# Patient Record
Sex: MALE | Race: ASIAN | NOT HISPANIC OR LATINO | Employment: OTHER | URBAN - METROPOLITAN AREA
[De-identification: names, ages, dates, MRNs, and addresses within clinical notes are randomized per-mention and may not be internally consistent; named-entity substitution may affect disease eponyms.]

---

## 2017-10-05 ENCOUNTER — OFFICE VISIT - HEALTHEAST (OUTPATIENT)
Dept: FAMILY MEDICINE | Facility: CLINIC | Age: 67
End: 2017-10-05

## 2017-10-05 DIAGNOSIS — Z71.84 TRAVEL ADVICE ENCOUNTER: ICD-10-CM

## 2017-10-05 RX ORDER — DOXYCYCLINE 100 MG/1
100 CAPSULE ORAL DAILY
Qty: 60 CAPSULE | Refills: 0 | Status: SHIPPED | OUTPATIENT
Start: 2017-10-05

## 2017-10-05 ASSESSMENT — MIFFLIN-ST. JEOR: SCORE: 1210.99

## 2021-05-31 VITALS — HEIGHT: 63 IN | WEIGHT: 124.75 LBS | BODY MASS INDEX: 22.11 KG/M2

## 2021-06-13 NOTE — PROGRESS NOTES
Travel advice  No issues  ROS: as noted above    OBJECTIVE:   Vitals:    10/05/17 1535   BP: 120/74   Pulse: (!) 48   Resp: 20      Eyes: non icteric, noninflamed  Lungs: no resp distress  Heart: regular  Ankles: no edema  Muscles: nontender  Mental status: euthymic  Neuro: nonfocal  ASSESSMENT/PLAN:    1. Travel advice encounter  doxycycline (VIBRAMYCIN) 100 MG capsule    typhoid (VIVOTIF) SR capsule    Pneumococcal conjugate vaccine 13-valent 6wks-17yrs; >50yrs     More than 10 of fifteen total minutes time spent education counseling regarding the issues and care of same as listed in the assessment and plan of this note

## 2021-09-24 ENCOUNTER — ANCILLARY PROCEDURE (OUTPATIENT)
Dept: ULTRASOUND IMAGING | Facility: CLINIC | Age: 71
End: 2021-09-24
Attending: EMERGENCY MEDICINE
Payer: MEDICARE

## 2021-09-24 ENCOUNTER — HOSPITAL ENCOUNTER (EMERGENCY)
Facility: CLINIC | Age: 71
Discharge: HOME | End: 2021-09-25
Attending: EMERGENCY MEDICINE | Admitting: EMERGENCY MEDICINE
Payer: MEDICARE

## 2021-09-24 ENCOUNTER — APPOINTMENT (OUTPATIENT)
Dept: RADIOLOGY | Facility: CLINIC | Age: 71
End: 2021-09-24
Attending: EMERGENCY MEDICINE
Payer: MEDICARE

## 2021-09-24 DIAGNOSIS — J90 PLEURAL EFFUSION: ICD-10-CM

## 2021-09-24 LAB
ANION GAP SERPL CALCULATED.3IONS-SCNC: 9 MMOL/L (ref 5–18)
BNP SERPL-MCNC: 48 PG/ML (ref 0–70)
BUN SERPL-MCNC: 12 MG/DL (ref 8–28)
CALCIUM SERPL-MCNC: 8.9 MG/DL (ref 8.5–10.5)
CHLORIDE BLD-SCNC: 103 MMOL/L (ref 98–107)
CO2 SERPL-SCNC: 25 MMOL/L (ref 22–31)
CREAT SERPL-MCNC: 0.77 MG/DL (ref 0.7–1.3)
ERYTHROCYTE [DISTWIDTH] IN BLOOD BY AUTOMATED COUNT: 12 % (ref 10–15)
GFR SERPL CREATININE-BSD FRML MDRD: >90 ML/MIN/1.73M2
GLUCOSE BLD-MCNC: 157 MG/DL (ref 70–125)
HCT VFR BLD AUTO: 40.8 % (ref 40–53)
HGB BLD-MCNC: 13.8 G/DL (ref 13.3–17.7)
MCH RBC QN AUTO: 32.1 PG (ref 26.5–33)
MCHC RBC AUTO-ENTMCNC: 33.8 G/DL (ref 31.5–36.5)
MCV RBC AUTO: 95 FL (ref 78–100)
PLATELET # BLD AUTO: 239 10E3/UL (ref 150–450)
POTASSIUM BLD-SCNC: 4 MMOL/L (ref 3.5–5)
RBC # BLD AUTO: 4.3 10E6/UL (ref 4.4–5.9)
SODIUM SERPL-SCNC: 137 MMOL/L (ref 136–145)
TROPONIN I SERPL-MCNC: <0.01 NG/ML (ref 0–0.29)
WBC # BLD AUTO: 8.7 10E3/UL (ref 4–11)

## 2021-09-24 PROCEDURE — 272N000706 US THORACENTESIS

## 2021-09-24 PROCEDURE — C9803 HOPD COVID-19 SPEC COLLECT: HCPCS

## 2021-09-24 PROCEDURE — 84484 ASSAY OF TROPONIN QUANT: CPT | Performed by: EMERGENCY MEDICINE

## 2021-09-24 PROCEDURE — 89051 BODY FLUID CELL COUNT: CPT | Performed by: EMERGENCY MEDICINE

## 2021-09-24 PROCEDURE — 87205 SMEAR GRAM STAIN: CPT | Performed by: EMERGENCY MEDICINE

## 2021-09-24 PROCEDURE — 83880 ASSAY OF NATRIURETIC PEPTIDE: CPT | Performed by: EMERGENCY MEDICINE

## 2021-09-24 PROCEDURE — 99285 EMERGENCY DEPT VISIT HI MDM: CPT | Mod: 25

## 2021-09-24 PROCEDURE — 32555 ASPIRATE PLEURA W/ IMAGING: CPT

## 2021-09-24 PROCEDURE — 85027 COMPLETE CBC AUTOMATED: CPT | Performed by: EMERGENCY MEDICINE

## 2021-09-24 PROCEDURE — 71045 X-RAY EXAM CHEST 1 VIEW: CPT

## 2021-09-24 PROCEDURE — 87070 CULTURE OTHR SPECIMN AEROBIC: CPT | Performed by: EMERGENCY MEDICINE

## 2021-09-24 PROCEDURE — 93005 ELECTROCARDIOGRAM TRACING: CPT | Performed by: EMERGENCY MEDICINE

## 2021-09-24 PROCEDURE — 89050 BODY FLUID CELL COUNT: CPT | Performed by: EMERGENCY MEDICINE

## 2021-09-24 PROCEDURE — 83615 LACTATE (LD) (LDH) ENZYME: CPT | Performed by: EMERGENCY MEDICINE

## 2021-09-24 PROCEDURE — 36415 COLL VENOUS BLD VENIPUNCTURE: CPT | Performed by: EMERGENCY MEDICINE

## 2021-09-24 PROCEDURE — 80048 BASIC METABOLIC PNL TOTAL CA: CPT | Performed by: EMERGENCY MEDICINE

## 2021-09-24 PROCEDURE — 84157 ASSAY OF PROTEIN OTHER: CPT | Performed by: EMERGENCY MEDICINE

## 2021-09-24 PROCEDURE — U0005 INFEC AGEN DETEC AMPLI PROBE: HCPCS | Performed by: EMERGENCY MEDICINE

## 2021-09-24 NOTE — ED PROVIDER NOTES
EMERGENCY DEPARTMENT ENCOUnter      NAME: Karri Scott  AGE: 71 year old male  YOB: 1950  MRN: [id number]  EVALUATION DATE & TIME: 2021  6:37 PM    PCP: No primary care provider on file.    ED PROVIDER: Timothy Hutton DO      Chief Complaint   Patient presents with     Shortness of Breath       FINAL IMPRESSION:  1. Pleural effusion        ED COURSE & MEDICAL DECISION MAKIN:01 PM I met with the patient, obtained history, performed an initial exam, and discussed options and plan for diagnostics and treatment here in the ED. Room 12. PPE worn: N95, goggles, gloves.  8:46 PM Rechecked patient and updated on results.  1:24 AM Rechecked patient and updated on results. Discussed plan for discharge - patient agreeable.    The patient presented to the emergency department today with worsening shortness of breath with exertion.  His x-ray today reveals evidence of a large right pleural effusion.  Laboratory testing has been unremarkable.  His case was discussed with interventional radiology and they felt that he would be amenable to an ultrasound-guided thoracentesis.  This was performed in approximately 1.4 L of fluid was removed.  The patient was feeling much better after this.  Fluid will be sent for testing.  Given the patient's overall well appearance and otherwise normal work-up I feel that he can be safely discharged home with instructions for close outpatient follow-up.  He has been instructed to return right away if there are any worsening symptoms or other concerns and he is comfortable with this plan.      At the conclusion of the encounter I discussed the results of all of the tests and the disposition. The questions were answered. The patient or family acknowledged understanding and was agreeable with the care plan.       =================================================================    HPI    Karri Scott is a 71 year old male who presents to the emergency department today with  complaints of shortness of breath.  He states that his symptoms have been progressive over the last few months but now he gets very winded with even minimal activity.  He does have a cough with mild production and notes that it is difficult to sleep at night unless he is sitting up in bed.  He denies any pain at rest.  No fevers.  He was seen earlier today at the clinic and told to come to the emergency department for further evaluation given concerns for possible congestive heart failure.  He denies any leg pain or swelling.  No other current complaints.      REVIEW OF SYSTEMS   Constitutional:  Denies fever or chills  HENT:  Denies sore throat   Respiratory: Positive for cough and shortness of breath  Cardiovascular:  Denies chest pain or palpitations  GI:  Denies abdominal pain, nausea, or vomiting  Musculoskeletal:  Denies any new extremity pain   Skin:  Denies rash   Neurologic:  Denies headache, focal weakness or sensory changes    All other systems reviewed and are negative      PAST MEDICAL HISTORY:  History reviewed. No pertinent past medical history.    PAST SURGICAL HISTORY:  History reviewed. No pertinent surgical history.    CURRENT MEDICATIONS:    ciprofloxacin (CIPRO) 500 MG tablet  doxycycline (VIBRAMYCIN) 100 MG capsule  typhoid (VIVOTIF) SR capsule  typhoid VI polysacch vaccine (TYPHIM VI) 25 mcg/0.5 mL Soln      ALLERGIES:  No Known Allergies    FAMILY HISTORY:  No family history on file.    SOCIAL HISTORY:   Social History     Socioeconomic History     Marital status:      Spouse name: None     Number of children: None     Years of education: None     Highest education level: None   Occupational History     None   Tobacco Use     Smoking status: Never Smoker     Smokeless tobacco: Never Used   Substance and Sexual Activity     Alcohol use: None     Drug use: None     Sexual activity: None   Other Topics Concern     None   Social History Narrative     None     Social Determinants of Health      Financial Resource Strain:      Difficulty of Paying Living Expenses:    Food Insecurity:      Worried About Running Out of Food in the Last Year:      Ran Out of Food in the Last Year:    Transportation Needs:      Lack of Transportation (Medical):      Lack of Transportation (Non-Medical):    Physical Activity:      Days of Exercise per Week:      Minutes of Exercise per Session:    Stress:      Feeling of Stress :    Social Connections:      Frequency of Communication with Friends and Family:      Frequency of Social Gatherings with Friends and Family:      Attends Sikhism Services:      Active Member of Clubs or Organizations:      Attends Club or Organization Meetings:      Marital Status:    Intimate Partner Violence:      Fear of Current or Ex-Partner:      Emotionally Abused:      Physically Abused:      Sexually Abused:        VITALS:  Patient Vitals for the past 24 hrs:   BP Temp Temp src Pulse Resp SpO2 Weight   09/25/21 0030 (!) 141/87 -- -- 99 28 90 % --   09/25/21 0000 (!) 149/81 -- -- 101 -- 90 % --   09/24/21 2330 (!) 169/84 -- -- 78 -- 92 % --   09/24/21 2300 135/85 -- -- 75 26 91 % --   09/24/21 2230 137/84 -- -- 81 22 92 % --   09/24/21 2200 125/80 -- -- 81 24 92 % --   09/24/21 2130 128/84 -- -- 88 20 93 % --   09/24/21 2100 -- -- -- 100 22 92 % --   09/24/21 2030 139/82 -- -- 87 25 92 % --   09/24/21 2008 -- -- -- 94 -- 93 % --   09/24/21 2000 127/78 -- -- 91 25 93 % --   09/24/21 1948 125/77 -- -- 94 -- 93 % --   09/24/21 1832 (!) 149/82 98.4  F (36.9  C) Oral 103 22 92 % 55.3 kg (122 lb)       PHYSICAL EXAM    Constitutional:  Well developed, Well nourished,  HENT:  Normocephalic, Atraumatic, Bilateral external ears normal, Oropharynx moist, Nose normal.   Neck:  Normal range of motion, No meningismus, No stridor.   Eyes:  EOMI, Conjunctiva normal, No discharge.   Respiratory: Diminished breath sounds in the right base, No respiratory distress, No wheezing, No chest tenderness.    Cardiovascular: Mild tachycardia, Normal rhythm, No murmurs  GI:  Soft, No tenderness, No guarding, No CVA tenderness.   Musculoskeletal:  Neurovascularly intact distally, No edema, No tenderness, No cyanosis, Good range of motion in all major joints. No tenderness to palpation or major deformities noted.   Integument:  Warm, Dry, No erythema, No rash.   Lymphatic:  No lymphadenopathy noted.   Neurologic:  Alert & oriented x 3, Normal motor function, Normal sensory function, No focal deficits noted.   Psychiatric:  Affect normal, Judgment normal, Mood normal.      LAB:  All pertinent labs reviewed and interpreted.  Results for orders placed or performed during the hospital encounter of 09/24/21              CBC with platelets   Result Value Ref Range    WBC Count 8.7 4.0 - 11.0 10e3/uL    RBC Count 4.30 (L) 4.40 - 5.90 10e6/uL    Hemoglobin 13.8 13.3 - 17.7 g/dL    Hematocrit 40.8 40.0 - 53.0 %    MCV 95 78 - 100 fL    MCH 32.1 26.5 - 33.0 pg    MCHC 33.8 31.5 - 36.5 g/dL    RDW 12.0 10.0 - 15.0 %    Platelet Count 239 150 - 450 10e3/uL   Basic metabolic panel   Result Value Ref Range    Sodium 137 136 - 145 mmol/L    Potassium 4.0 3.5 - 5.0 mmol/L    Chloride 103 98 - 107 mmol/L    Carbon Dioxide (CO2) 25 22 - 31 mmol/L    Anion Gap 9 5 - 18 mmol/L    Urea Nitrogen 12 8 - 28 mg/dL    Creatinine 0.77 0.70 - 1.30 mg/dL    Calcium 8.9 8.5 - 10.5 mg/dL    Glucose 157 (H) 70 - 125 mg/dL    GFR Estimate >90 >60 mL/min/1.73m2   Result Value Ref Range    Troponin I <0.01 0.00 - 0.29 ng/mL   B-Type Natriuretic Peptide ( East Only)   Result Value Ref Range    BNP 48 0 - 70 pg/mL   Symptomatic COVID-19 Virus (Coronavirus) by PCR Nasopharyngeal    Specimen: Nasopharyngeal; Swab   Result Value Ref Range    SARS CoV2 PCR Negative Negative   Cell Count Body Fluid   Result Value Ref Range    Color Red (A) Colorless    Clarity Cloudy (A) Clear, Bloody    Total Nucleated Cells 1,063 /uL    RBC Count 40,000 /uL   Differential  Body Fluid   Result Value Ref Range    % Neutrophils 7 %    % Lymphocytes 33 %    % Monocyte/Macrophages 60 %       RADIOLOGY:  I have independently reviewed and interpreted the above imaging, pending the final radiology read.  XR Chest Port 1 View   Final Result   IMPRESSION: Moderate-sized right pleural effusion and right basilar atelectasis/consolidation. Trace left pleural effusion. No pneumothorax. No focal infiltrates in the left lung. Normal heart size.      POC US GUIDE FOR THORCENTESIS    (Results Pending)   US Thoracentesis    (Results Pending)           I, Dayne Kim, am serving as a scribe to document services personally performed by Dr. Hutton based on my observation and the provider's statements to me. I, Timothy Hutton, DO attest that Dayne Kim is acting in a scribe capacity, has observed my performance of the services and has documented them in accordance with my direction.    Timothy Hutton, DO  Emergency Medicine  [facility] EMERGENCY ROOM  [address] [id number] [phone]  Dept: [phone]     Timothy Hutton MD  09/25/21 0127

## 2021-09-25 VITALS
SYSTOLIC BLOOD PRESSURE: 141 MMHG | TEMPERATURE: 98.4 F | RESPIRATION RATE: 28 BRPM | BODY MASS INDEX: 21.61 KG/M2 | OXYGEN SATURATION: 90 % | HEART RATE: 99 BPM | DIASTOLIC BLOOD PRESSURE: 87 MMHG | WEIGHT: 122 LBS

## 2021-09-25 LAB
APPEARANCE FLD: ABNORMAL
COLOR FLD: ABNORMAL
GRAM STAIN RESULT: ABNORMAL
GRAM STAIN RESULT: ABNORMAL
LDH FLD L TO P-CCNC: 1379 U/L
LYMPHOCYTES NFR FLD MANUAL: 33 %
MONOS+MACROS NFR FLD MANUAL: 60 %
NEUTS BAND NFR FLD MANUAL: 7 %
PROT FLD-MCNC: 4.8 G/DL
RBC # FLD: ABNORMAL /UL
SARS-COV-2 RNA RESP QL NAA+PROBE: NEGATIVE
WBC # FLD AUTO: 1063 /UL

## 2021-09-26 LAB
ATRIAL RATE - MUSE: 98 BPM
DIASTOLIC BLOOD PRESSURE - MUSE: NORMAL MMHG
INTERPRETATION ECG - MUSE: NORMAL
P AXIS - MUSE: 69 DEGREES
PR INTERVAL - MUSE: 210 MS
QRS DURATION - MUSE: 128 MS
QT - MUSE: 376 MS
QTC - MUSE: 480 MS
R AXIS - MUSE: -57 DEGREES
SYSTOLIC BLOOD PRESSURE - MUSE: NORMAL MMHG
T AXIS - MUSE: 70 DEGREES
VENTRICULAR RATE- MUSE: 98 BPM

## 2021-09-28 LAB — BACTERIA PLR CULT: NO GROWTH

## 2024-03-11 NOTE — ED TRIAGE NOTES
Feeling short of breath x 3 months, went to see PCP today and advised to come to ED. Known cardiac arrhythmia, but no history of congestive heart failure, COPD or other cardiac problems. Patient reports activity increases breathing problems and he can no longer lay flat. Reports wheezing. Clinic also reports low oxygen   (92% at arrival on room air)    none